# Patient Record
Sex: FEMALE | Race: WHITE | Employment: FULL TIME | URBAN - METROPOLITAN AREA
[De-identification: names, ages, dates, MRNs, and addresses within clinical notes are randomized per-mention and may not be internally consistent; named-entity substitution may affect disease eponyms.]

---

## 2017-02-21 ENCOUNTER — HOSPITAL ENCOUNTER (EMERGENCY)
Facility: HOSPITAL | Age: 37
Discharge: HOME | End: 2017-02-21
Attending: EMERGENCY MEDICINE
Payer: COMMERCIAL

## 2017-02-21 VITALS
WEIGHT: 139 LBS | HEART RATE: 95 BPM | OXYGEN SATURATION: 97 % | BODY MASS INDEX: 22.34 KG/M2 | SYSTOLIC BLOOD PRESSURE: 153 MMHG | RESPIRATION RATE: 18 BRPM | TEMPERATURE: 98 F | DIASTOLIC BLOOD PRESSURE: 75 MMHG | HEIGHT: 66 IN

## 2017-02-21 DIAGNOSIS — I49.3 PREMATURE VENTRICULAR CONTRACTIONS: Primary | ICD-10-CM

## 2017-02-21 DIAGNOSIS — R00.2 PALPITATIONS: ICD-10-CM

## 2017-02-21 PROCEDURE — 93010 ELECTROCARDIOGRAM REPORT: CPT | Performed by: INTERNAL MEDICINE

## 2017-02-21 PROCEDURE — 93010 ELECTROCARDIOGRAM REPORT: CPT

## 2017-02-21 PROCEDURE — 93005 ELECTROCARDIOGRAM TRACING: CPT

## 2017-02-21 PROCEDURE — 99284 EMERGENCY DEPT VISIT MOD MDM: CPT

## 2017-02-22 NOTE — ED PROVIDER NOTES
Patient Seen in: [facility] AND [facility] Emergency Department    History   Patient presents with: Anxiety/Panic attack (neurologic)    Stated Complaint: palpitations    HPI    Patient presents to the emergency department for palpitations.   She was seen at [facility] above.    [facility] elements reviewed from today and agreed except as otherwise stated in HPI.     Physical Exam     ED Triage Vitals   BP 02/21/17 1828 127/83 mmHg   Pulse 02/21/17 1828 104   Resp 02/21/17 1828 22   Temp 02/21/17 1828 98.3 °F (36.8 °C)   Temp s Clinical Impression:  Premature ventricular contractions  (primary encounter diagnosis)  Palpitations    Disposition:  There is no disposition on file for this visit.     Follow-up:  Kaiser Calvert MD  [address] [place] [address]

## 2017-02-22 NOTE — ED INITIAL ASSESSMENT (HPI)
Pt reports, \"I was at [facility] today because I felt that my pulse was racing. They did a chest xry, and blood work and everything came back normal. They said I had PVC's\". Pt reports hx of anxiety. Pt denies sob, headache, and dizziness.

## 2018-11-15 PROCEDURE — 88175 CYTOPATH C/V AUTO FLUID REDO: CPT | Performed by: OBSTETRICS & GYNECOLOGY

## 2018-11-15 PROCEDURE — 87624 HPV HI-RISK TYP POOLED RSLT: CPT | Performed by: OBSTETRICS & GYNECOLOGY

## 2019-01-04 PROCEDURE — 88305 TISSUE EXAM BY PATHOLOGIST: CPT | Performed by: OBSTETRICS & GYNECOLOGY

## 2019-08-07 ENCOUNTER — WALK IN (OUTPATIENT)
Dept: URGENT CARE | Age: 39
End: 2019-08-07

## 2019-08-07 DIAGNOSIS — Z11.1 SCREENING FOR TUBERCULOSIS: Primary | ICD-10-CM

## 2019-08-07 PROCEDURE — 86580 TB INTRADERMAL TEST: CPT | Performed by: NURSE PRACTITIONER

## 2019-08-09 ENCOUNTER — WALK IN (OUTPATIENT)
Dept: URGENT CARE | Age: 39
End: 2019-08-09

## 2019-08-09 DIAGNOSIS — Z11.1 ENCOUNTER FOR PPD SKIN TEST READING: Primary | ICD-10-CM

## 2019-08-09 LAB
INDURATION: 0 MM (ref 0–?)
SKIN TEST RESULT: NEGATIVE

## 2019-08-09 PROCEDURE — X1094 NO CHARGE VISIT: HCPCS | Performed by: NURSE PRACTITIONER

## 2019-10-17 PROBLEM — J01.90 ACUTE SINUSITIS: Status: ACTIVE | Noted: 2019-10-17

## 2019-10-17 PROBLEM — J02.9 PHARYNGITIS: Status: ACTIVE | Noted: 2019-10-17

## 2019-10-17 PROBLEM — E55.9 VITAMIN D DEFICIENCY: Status: ACTIVE | Noted: 2019-10-17

## 2019-10-17 PROBLEM — J30.9 ALLERGIC RHINITIS: Status: ACTIVE | Noted: 2019-10-17

## 2020-11-23 PROBLEM — E78.00 ELEVATED LDL CHOLESTEROL LEVEL: Status: ACTIVE | Noted: 2020-11-23

## 2020-11-23 PROBLEM — J01.90 ACUTE SINUSITIS: Status: RESOLVED | Noted: 2019-10-17 | Resolved: 2020-11-23

## 2020-11-23 PROBLEM — J02.9 PHARYNGITIS: Status: RESOLVED | Noted: 2019-10-17 | Resolved: 2020-11-23

## 2021-03-08 PROBLEM — R09.82 PND (POST-NASAL DRIP): Status: ACTIVE | Noted: 2021-03-08

## 2021-03-08 PROBLEM — K21.9 GASTROESOPHAGEAL REFLUX DISEASE WITHOUT ESOPHAGITIS: Status: ACTIVE | Noted: 2021-03-08

## 2021-04-05 PROBLEM — E78.2 MIXED HYPERLIPIDEMIA: Status: ACTIVE | Noted: 2021-04-05

## 2022-05-15 ENCOUNTER — HOSPITAL ENCOUNTER (OUTPATIENT)
Age: 42
Discharge: HOME | End: 2022-05-15
Attending: EMERGENCY MEDICINE
Payer: COMMERCIAL

## 2022-05-15 VITALS
BODY MASS INDEX: 26.46 KG/M2 | HEIGHT: 64 IN | OXYGEN SATURATION: 100 % | SYSTOLIC BLOOD PRESSURE: 118 MMHG | HEART RATE: 111 BPM | DIASTOLIC BLOOD PRESSURE: 66 MMHG | WEIGHT: 155 LBS | RESPIRATION RATE: 18 BRPM | TEMPERATURE: 99 F

## 2022-05-15 DIAGNOSIS — U07.1 ACUTE COVID-19: Primary | ICD-10-CM

## 2022-05-15 LAB — SARS-COV-2 RNA RESP QL NAA+PROBE: DETECTED

## 2022-05-15 PROCEDURE — 99202 OFFICE O/P NEW SF 15 MIN: CPT

## 2022-05-15 NOTE — ED INITIAL ASSESSMENT (HPI)
Per pt has positive at home covid test on Friday when symptoms of feeling tired,headache and intermittent fevers started. Pt here to confirm covid results.

## (undated) NOTE — LETTER
Date & Time: 5/15/2022, 1:41 PM  Patient: Antoinette Fitzgerald  Encounter Provider(s):    Eleanor Severe, MD       To Whom It May Concern:    Antoinette Fitzgerald was seen and treated in our department on 5/15/2022.  She may return to work if asymptomatic by 5/19/2022  If you have any questions or concerns, please do not hesitate to call.        _____________________________  Physician/APC Signature

## (undated) NOTE — ED AVS SNAPSHOT
[facility] Emergency Department    [address]     [place] [address]    Phone:  [phone]    Fax:  [phone]           Lord Mckeon   MRN: [id number]    Department:  [facility] Emergency Department   Date of Visit:  2/21/201 aspect of your visit today. In an effort to constantly improve our service to you, we would appreciate any positive or negative feedback related to the care you received in our emergency department. Please call our [address] [facility] [facility] at [phone].   Your Emily contact you. Please make sure we have your correct phone number on file.       I certified that I have received a copy of the aftercare instructions; that these instructions have been explained to me; all questions pertaining to these instructions have bee If you've recently had a stay at the Hospital you can access your discharge instructions in [employer] by going to Visits < Admission Summaries.  If you've been to the Emergency Department or your doctor's office, you can view your past visit information in My

## (undated) NOTE — ED AVS SNAPSHOT
[facility] Emergency Department    [address]     [place] [address]    Phone:  [phone]    Fax:  [phone]           Krystabekah Ortizh   MRN: [id number]    Department:  [facility] Emergency Department   Date of Visit:  2/21/201 and Class Registration line at [phone] or find a doctor online by visiting [email or web address].    IF THERE IS ANY CHANGE OR WORSENING OF YOUR CONDITION, CALL YOUR PRIMARY CARE PHYSICIAN AT ONCE OR RETURN IMMEDIATELY TO [address] [place] [address].     If